# Patient Record
Sex: FEMALE | Race: WHITE | NOT HISPANIC OR LATINO | URBAN - METROPOLITAN AREA
[De-identification: names, ages, dates, MRNs, and addresses within clinical notes are randomized per-mention and may not be internally consistent; named-entity substitution may affect disease eponyms.]

---

## 2022-10-13 ENCOUNTER — APPOINTMENT (RX ONLY)
Dept: URBAN - METROPOLITAN AREA CLINIC 81 | Facility: CLINIC | Age: 62
Setting detail: DERMATOLOGY
End: 2022-10-13

## 2022-10-13 DIAGNOSIS — L91.0 HYPERTROPHIC SCAR: ICD-10-CM | Status: WORSENING

## 2022-10-13 DIAGNOSIS — M62.9 DISORDER OF MUSCLE, UNSPECIFIED: ICD-10-CM

## 2022-10-13 DIAGNOSIS — L91.0 HYPERTROPHIC SCAR: ICD-10-CM

## 2022-10-13 PROBLEM — M62.89 OTHER SPECIFIED DISORDERS OF MUSCLE: Status: ACTIVE | Noted: 2022-10-13

## 2022-10-13 PROCEDURE — 11900 INJECT SKIN LESIONS </W 7: CPT

## 2022-10-13 PROCEDURE — ? COUNSELING

## 2022-10-13 PROCEDURE — ? DIAGNOSIS COMMENT

## 2022-10-13 PROCEDURE — ? CONSULTATION - BOTULINUM TOXIN

## 2022-10-13 PROCEDURE — ? ADDITIONAL NOTES

## 2022-10-13 PROCEDURE — ? PHOTO-DOCUMENTATION

## 2022-10-13 PROCEDURE — ? TREATMENT REGIMEN

## 2022-10-13 PROCEDURE — 99202 OFFICE O/P NEW SF 15 MIN: CPT

## 2022-10-13 PROCEDURE — ? COUNSELING - HYPERTROPHIC SCAR

## 2022-10-13 PROCEDURE — ? INTRALESIONAL KENALOG

## 2022-10-13 ASSESSMENT — LOCATION DETAILED DESCRIPTION DERM
LOCATION DETAILED: LEFT INFERIOR POSTAURICULAR SKIN
LOCATION DETAILED: RIGHT CENTRAL POSTAURICULAR SKIN
LOCATION DETAILED: LEFT MID PREAURICULAR CHEEK
LOCATION DETAILED: RIGHT MID PREAURICULAR CHEEK
LOCATION DETAILED: LEFT INFERIOR PREAURICULAR CHEEK

## 2022-10-13 ASSESSMENT — LOCATION SIMPLE DESCRIPTION DERM
LOCATION SIMPLE: LEFT CHEEK
LOCATION SIMPLE: RIGHT CHEEK
LOCATION SIMPLE: SCALP

## 2022-10-13 ASSESSMENT — LOCATION ZONE DERM
LOCATION ZONE: FACE
LOCATION ZONE: SCALP

## 2022-10-13 NOTE — PROCEDURE: ADDITIONAL NOTES
Additional Notes: Pt believes fat grafting has moved inferiority and become displaced, unlikely, favored to be more muscle hypertrophy, referral to [facility] for Botox to be coordinated at next appt

## 2022-10-13 NOTE — PROCEDURE: CONSULTATION - BOTULINUM TOXIN
Platysma Secondary Toxin Units (Estimated): 0
Detail Level: Detailed
Send Procedure Quote As Charge: No

## 2022-10-13 NOTE — PROCEDURE: DIAGNOSIS COMMENT
Comment: Masseter hypertrophy post face lift
Detail Level: Simple
Render Risk Assessment In Note?: no

## 2022-10-13 NOTE — PROCEDURE: ADDITIONAL NOTES
Additional Notes: Pt states 3 months ago she went to [place] for face lift and tummy tuck. Her complaint is her keloid scars from the face lift. Pt states she had her own fat injected into her face, she is having an issue with the way it looks. Additional Notes: Pt states 3 months ago she went to [facility] for face lift and tummy tuck. Her complaint is her keloid scars from the face lift. Pt states she had her own fat injected into her face, she is having an issue with the way it looks.

## 2022-10-13 NOTE — PROCEDURE: TREATMENT REGIMEN
Initiate Treatment: Informed pt she needs to do scar massages daily. Pt is aware about biocorneum for fresh scars from facelift surgery in [place]. Initiate Treatment: Informed pt she needs to do scar massages daily. Pt is aware about biocorneum for fresh scars from facelift surgery in [place].

## 2022-10-13 NOTE — PROCEDURE: ADDITIONAL NOTES
Additional Notes: The patient has hypertrophy of the masseter muscle with no evidence of \"fat dropping.\" \\nWe discussed this could be treated with injection of botox. We will refer her to [facility] for botox injection if she is interested.
Render Risk Assessment In Note?: no

## 2022-12-08 ENCOUNTER — APPOINTMENT (RX ONLY)
Dept: URBAN - METROPOLITAN AREA CLINIC 81 | Facility: CLINIC | Age: 62
Setting detail: DERMATOLOGY
End: 2022-12-08

## 2022-12-08 DIAGNOSIS — Z41.9 ENCOUNTER FOR PROCEDURE FOR PURPOSES OTHER THAN REMEDYING HEALTH STATE, UNSPECIFIED: ICD-10-CM

## 2022-12-08 DIAGNOSIS — L91.0 HYPERTROPHIC SCAR: ICD-10-CM

## 2022-12-08 PROCEDURE — ? TREATMENT REGIMEN

## 2022-12-08 PROCEDURE — ? ADDITIONAL NOTES

## 2022-12-08 PROCEDURE — ? COUNSELING

## 2022-12-08 PROCEDURE — 11900 INJECT SKIN LESIONS </W 7: CPT

## 2022-12-08 PROCEDURE — ? COSMETIC CONSULTATION: GENERAL

## 2022-12-08 PROCEDURE — ? INTRALESIONAL KENALOG

## 2022-12-08 ASSESSMENT — LOCATION SIMPLE DESCRIPTION DERM
LOCATION SIMPLE: RIGHT CHEEK
LOCATION SIMPLE: LEFT CHEEK
LOCATION SIMPLE: SCALP

## 2022-12-08 ASSESSMENT — LOCATION DETAILED DESCRIPTION DERM
LOCATION DETAILED: LEFT INFERIOR PREAURICULAR CHEEK
LOCATION DETAILED: RIGHT MID PREAURICULAR CHEEK
LOCATION DETAILED: LEFT INFERIOR POSTAURICULAR SKIN
LOCATION DETAILED: LEFT CENTRAL POSTAURICULAR SKIN
LOCATION DETAILED: RIGHT INFERIOR PREAURICULAR CHEEK
LOCATION DETAILED: RIGHT CENTRAL POSTAURICULAR SKIN
LOCATION DETAILED: RIGHT INFERIOR POSTAURICULAR SKIN
LOCATION DETAILED: LEFT MID PREAURICULAR CHEEK

## 2022-12-08 ASSESSMENT — LOCATION ZONE DERM
LOCATION ZONE: FACE
LOCATION ZONE: SCALP

## 2022-12-08 NOTE — PROCEDURE: ADDITIONAL NOTES
Detail Level: Detailed
Render Risk Assessment In Note?: no
Additional Notes: Discussed Botox for jawline.  Advised pt not sure how much it will help her bite. Pt is aware \\nIf Botox doesn’t work pt will have to go back to plastic surgeon \\nQuoted 300.00 for left jawline \\n600.00 for both sides of jawline \\n\\nFace 400.00 mob 360.00 upper face Botox\\n\\nDiscussed filler glabella 650.00 \\nCheek filler 2 syringes 750 first 700.00 second mob\\n\\nRecommend to come back for glabella filler not same day as Botox

## 2022-12-08 NOTE — PROCEDURE: INTRALESIONAL KENALOG
How Many Mls Were Removed From The 40 Mg/Ml (10ml) Vial When Preparing The Injectable Solution?: 0
Kenalog Preparation: Kenalog
Consent: The risks of atrophy were reviewed with the patient.
Administered By (Optional): 
Treatment Number (Optional): 2
Detail Level: Detailed
Validate Note Data When Using Inventory: Yes
Total Volume Injected (Ccs- Only Use Numbers And Decimals): 1
Medical Necessity Clause: This procedure was medically necessary because the lesions that were treated were:
Include Z78.9 (Other Specified Conditions Influencing Health Status) As An Associated Diagnosis?: No
Concentration Of Solution Injected (Mg/Ml): 5.0
Which Kenalog Vial Was Used?: Kenalog 10 mg/ml (5 ml vial)

## 2022-12-08 NOTE — PROCEDURE: TREATMENT REGIMEN
Initiate Treatment: Informed pt she needs to do scar massages daily. Pt is aware about biocorneum for fresh scars from facelift surgery in [place]. Initiate Treatment: Informed pt she needs to do scar massages daily. Pt is aware about biocorneum for fresh scars from facelift surgery in [place].

## 2022-12-15 NOTE — PROCEDURE: INTRALESIONAL KENALOG
oriented to person, place and time, normal sensation, short and long term memory intact, sensory exam intact
Include Z78.9 (Other Specified Conditions Influencing Health Status) As An Associated Diagnosis?: No
Validate Note Data When Using Inventory: Yes
Detail Level: Detailed
Treatment Number (Optional): 1
Concentration Of Solution Injected (Mg/Ml): 10.0
Kenalog Preparation: Kenalog
Total Volume Injected (Ccs- Only Use Numbers And Decimals): 3.0
Which Kenalog Vial Was Used?: Kenalog 10 mg/ml (5 ml vial)
Consent: The risks of atrophy were reviewed with the patient.
How Many Mls Were Removed From The 10 Mg/Ml (5ml) Vial When Preparing The Injectable Solution?: 3
How Many Mls Were Removed From The 40 Mg/Ml (5ml) Vial When Preparing The Injectable Solution?: 0
Administered By (Optional): 
Medical Necessity Clause: This procedure was medically necessary because the lesions that were treated were: